# Patient Record
Sex: MALE | Race: WHITE
[De-identification: names, ages, dates, MRNs, and addresses within clinical notes are randomized per-mention and may not be internally consistent; named-entity substitution may affect disease eponyms.]

---

## 2017-07-07 ENCOUNTER — HOSPITAL ENCOUNTER (EMERGENCY)
Dept: HOSPITAL 47 - EC | Age: 37
Discharge: HOME | End: 2017-07-07
Payer: COMMERCIAL

## 2017-07-07 VITALS
DIASTOLIC BLOOD PRESSURE: 70 MMHG | HEART RATE: 53 BPM | SYSTOLIC BLOOD PRESSURE: 104 MMHG | TEMPERATURE: 97.9 F | RESPIRATION RATE: 20 BRPM
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
## 2017-07-07 DIAGNOSIS — S92.322A: Primary | ICD-10-CM

## 2017-07-07 PROCEDURE — 29515 APPLICATION SHORT LEG SPLINT: CPT

## 2017-07-07 PROCEDURE — 99283 EMERGENCY DEPT VISIT LOW MDM: CPT

## 2017-07-07 NOTE — XR
EXAMINATION TYPE: XR foot complete LT , 3 VIEWS

 

DATE OF EXAM ORDERED: 7/7/2017

 

HISTORY: Pain.

 

COMPARISON: None.

 

FINDINGS:  There is a mild hallux valgus deformity. There is fibular deviation of the left second dig
it. There is a partially healed fracture of the proximal diaphysis of the left second digit. This is 
mildly comminuted and minimally displaced. No additional fractures are seen.

 

IMPRESSION: MINIMALLY DISPLACED, COMMINUTED FRACTURE THE PROXIMAL DIAPHYSIS OF THE LEFT SECOND DIGIT.


 

CODE A: INITIAL ASSESSMENT FOR CLOSED FRACTURE

## 2017-07-07 NOTE — ED
General Adult HPI





- General


Chief complaint: Extremity Injury, Lower


Stated complaint: crushing injury left foot


Time Seen by Provider: 07/07/17 08:59


Source: patient, RN notes reviewed


Mode of arrival: ambulatory


Limitations: no limitations





- History of Present Illness


Initial comments: 





Patient 36-year-old male who presents emergency room today with a chief 

complaint of an injury to the left foot that occurred approximately 10 days 

ago.  He does admit that he dropped a proximally 50 pound pieces to across his 

left foot.  He does admit that he had work boots on at the time.  He states 

that it was black and blue with some bruising and swelling.  States swelling 

and bruising has improved but he still having pain across top of foot.  States 

causing to walk with a limp.  He denies any other complaints or associated 

symptoms. Patient denies any recent fever, chills, shortness of breath, chest 

pain, back pain, abdominal pain, nausea or vomiting, numbness or tingling, 

dysuria or hematuria, constipation or diarrhea, headaches or visual changes, or 

any other complaints.





- Related Data


 Home Medications











 Medication  Instructions  Recorded  Confirmed


 


Acetaminophen Tab [Tylenol Tab] 650 mg PO Q4H PRN 07/07/17 07/07/17








 Previous Rx's











 Medication  Instructions  Recorded


 


Hydrocodone/Acetaminophen [Norco 1 each PO Q6HR PRN #15 tab 07/07/17





5-325]  











 Allergies











Allergy/AdvReac Type Severity Reaction Status Date / Time


 


No Known Allergies Allergy   Verified 07/07/17 08:55














Review of Systems


ROS Statement: 


Those systems with pertinent positive or pertinent negative responses have been 

documented in the HPI.





ROS Other: All systems not noted in ROS Statement are negative.





Past Medical History


Past Medical History: No Reported History


History of Any Multi-Drug Resistant Organisms: None Reported


Past Surgical History: No Surgical Hx Reported


Past Psychological History: No Psychological Hx Reported


Smoking Status: Never smoker


Past Alcohol Use History: None Reported


Past Drug Use History: None Reported





General Exam





- General Exam Comments


Initial Comments: 





General:  The patient is awake and alert, in no distress, and does not appear 

acutely ill.   


Neck:  The neck is supple, there is no tenderness or JVD.  


Cardiovascular:  There is a regular rate and rhythm. No murmur, rub or gallop 

is appreciated.


Respiratory:  Lungs are clear to auscultation, respirations are non-labored, 

breath sounds are equal.  No wheezes, stridor, rales, or rhonchi.


Musculoskeletal: Normal appearance of the left foot no obvious deformity.  No 

swelling bruising at this time.  Patient does have tenderness over the distal 

second through fourth metatarsals.  No other bony tenderness on exam.  Shows 

good range of motion.  Strength 5/5.  Pulses equal bilaterally 2+.  Sensation 

intact.


Neurological:  A&O x 3. CN II-XII intact, There are no obvious motor or sensory 

deficits. Coordination appears grossly intact. Speech is normal.


Skin:  Skin is warm and dry and no rashes or lesions are noted. 


Psychiatric:  Normal mood and affect.  


Limitations: no limitations





Course


 Vital Signs











  07/07/17





  08:49


 


Temperature 97.9 F


 


Pulse Rate 53 L


 


Respiratory 20





Rate 


 


Blood Pressure 104/70


 


O2 Sat by Pulse 98





Oximetry 














Medical Decision Making





- Medical Decision Making





X-rays reviewed and does show a proximal second metatarsal fracture with 

minimal to no displacement.  Results were discussed with the patient.  Patient 

did have a tether on this left ankle and he did cause by mouth officer and they 

did advise him that he may cut it off and that they would replace it if he goes 

over there.  We did remove his tether here in the emergency room so we can 

place a short leg posterior OCL splint.  Neurovascular rechecked and intact.  

Patient will be given a prescription for crutches and a few pain pills of Norco 

and advised follow-up with orthopedics.





Disposition


Clinical Impression: 


 Foot fracture, left





Disposition: HOME SELF-CARE


Condition: Good


Instructions:  Foot Fracture in Adults (ED)


Additional Instructions: 


Please leave splint in place until follow-up with orthopedics.  Please use 

crutches with nonweightbearing.  Please continue to ice elevate the affected 

area.  Please return for any other concerns.


Prescriptions: 


Hydrocodone/Acetaminophen [Norco 5-325] 1 each PO Q6HR PRN #15 tab


 PRN Reason: Pain


Referrals: 


None,Stated [Primary Care Provider] - 1-2 days


Alex Bernardo MD [STAFF PHYSICIAN] - 1-2 days


Time of Disposition: 10:01

## 2017-08-07 ENCOUNTER — HOSPITAL ENCOUNTER (EMERGENCY)
Dept: HOSPITAL 47 - EC | Age: 37
Discharge: HOME | End: 2017-08-07
Payer: COMMERCIAL

## 2017-08-07 VITALS
TEMPERATURE: 97.9 F | SYSTOLIC BLOOD PRESSURE: 128 MMHG | HEART RATE: 70 BPM | RESPIRATION RATE: 18 BRPM | DIASTOLIC BLOOD PRESSURE: 80 MMHG

## 2017-08-07 DIAGNOSIS — S02.5XXA: Primary | ICD-10-CM
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
## 2017-08-07 PROCEDURE — 99283 EMERGENCY DEPT VISIT LOW MDM: CPT

## 2017-08-07 NOTE — ED
General Adult HPI





- General


Chief complaint: Allergic Reaction


Stated complaint: Dental


Time Seen by Provider: 08/07/17 14:50


Source: patient, RN notes reviewed


Mode of arrival: ambulatory


Limitations: no limitations





- History of Present Illness


Initial comments: 





36 yo male presents to the ER with chief complaint of right sided tooth 

fracture.  Patient states that his tooth #6 is where he points.  Patient has 

had this pain for about 2 weeks now he ate something today and further broke.  

Patient has been having pain today so he called the dentist and they referred 

him here.  Patient states it hurts up into the gumline.  There is been no fever 

chills no pain into the neck or difficulty opening or closing the mouth. 

Patient denies any recent fever, chills, shortness of breath, chest pain, back 

pain, abdominal pain, nausea vomiting, numbness or tingling, dysuria or 

hematuria, constipation or diarrhea, headaches or visual changes, or any other 

current symptoms.





- Related Data


 Home Medications











 Medication  Instructions  Recorded  Confirmed


 


Acetaminophen Tab [Tylenol Tab] 650 mg PO Q4H PRN 07/07/17 07/07/17








 Previous Rx's











 Medication  Instructions  Recorded


 


Hydrocodone/Acetaminophen [Norco 1 each PO Q6HR PRN #15 tab 07/07/17





5-325]  


 


Hydrocodone/Acetaminophen [Norco 1 each PO Q6HR PRN #20 tab 08/07/17





5-325]  


 


Penicillin V Potassium [Pen Vee K] 500 mg PO TID #40 tab 08/07/17











 Allergies











Allergy/AdvReac Type Severity Reaction Status Date / Time


 


No Known Allergies Allergy   Verified 07/07/17 08:55














Review of Systems


ROS Statement: 


Those systems with pertinent positive or pertinent negative responses have been 

documented in the HPI.





ROS Other: All systems not noted in ROS Statement are negative.





Past Medical History


Past Medical History: No Reported History


History of Any Multi-Drug Resistant Organisms: None Reported


Past Surgical History: No Surgical Hx Reported


Past Psychological History: No Psychological Hx Reported


Smoking Status: Never smoker


Past Alcohol Use History: None Reported


Past Drug Use History: None Reported





General Exam


Limitations: no limitations


General appearance: alert, in no apparent distress


ENT exam: Present: normal exam, mucous membranes moist


  ** Expanded


Ear exam: Present: normal external inspection


Mouth exam: Present: normal external inspection


Teeth exam: Present: dental caries (Diffuse), fractured tooth # (6), dental 

tenderness # (6), other (No abscess noted).  Absent: gingival enlargement


Neck exam: Present: normal inspection.  Absent: tenderness, meningismus, 

lymphadenopathy


Respiratory exam: Present: normal lung sounds bilaterally.  Absent: respiratory 

distress, wheezes, rales, rhonchi, stridor


Cardiovascular Exam: Present: regular rate, normal rhythm, normal heart sounds.

  Absent: systolic murmur, diastolic murmur, rubs, gallop, clicks


Neurological exam: Present: alert, oriented X3


Psychiatric exam: Present: normal affect, normal mood


Skin exam: Present: warm, dry, intact, normal color.  Absent: rash





Course





 Vital Signs











  08/07/17





  14:53


 


Temperature 97.9 F


 


Pulse Rate 70


 


Respiratory 18





Rate 


 


Blood Pressure 128/80


 


O2 Sat by Pulse 99





Oximetry 














Medical Decision Making





- Medical Decision Making





37-year-old male presents emergency department chief complaint of right-sided 

dental pain.  This time patient underwent pain medication here.  We discussed 

follow-up with his dentist open her antibiotics pain meds for home.  We did 

discuss her temporalis all his questions.  He stated that he understood and is 

in agreement with plan.  All cushions have been answered.  He will be 

discharged.





Disposition


Clinical Impression: 


 Tooth fracture





Disposition: HOME SELF-CARE


Condition: Stable


Instructions:  Toothache (ED)


Additional Instructions: 


Please use medication as discussed. Please follow up with family doctor if 

symptoms have not improved over the next two days. Please return to the 

emergency room if your symptoms increase or worsen or for any other concerns.


[facility] [address] [address]





[phone] (existing clients only)





For new clients: [phone]





1st consult: $50 (includes Xrays)





Usually 30% less then private dentist for visits after. 





[facility]





Have to pay $50 for Xrays anmd rest is covered.





[phone] 








Prescriptions: 


Hydrocodone/Acetaminophen [Norco 5-325] 1 each PO Q6HR PRN #20 tab


 PRN Reason: Pain


Penicillin V Potassium [Pen Vee K] 500 mg PO TID #40 tab


Referrals: 


Andrews Benavides DO [STAFF PHYSICIAN] - 1-2 days


Time of Disposition: 15:01

## 2022-03-18 ENCOUNTER — HOSPITAL ENCOUNTER (EMERGENCY)
Dept: HOSPITAL 47 - EC | Age: 42
Discharge: HOME | End: 2022-03-18
Payer: COMMERCIAL

## 2022-03-18 VITALS — TEMPERATURE: 98.4 F | DIASTOLIC BLOOD PRESSURE: 72 MMHG | HEART RATE: 77 BPM | SYSTOLIC BLOOD PRESSURE: 115 MMHG

## 2022-03-18 VITALS — RESPIRATION RATE: 18 BRPM

## 2022-03-18 DIAGNOSIS — D72.829: ICD-10-CM

## 2022-03-18 DIAGNOSIS — N20.0: Primary | ICD-10-CM

## 2022-03-18 LAB
ALBUMIN SERPL-MCNC: 4.6 G/DL (ref 3.5–5)
ALP SERPL-CCNC: 69 U/L (ref 38–126)
ALT SERPL-CCNC: 38 U/L (ref 4–49)
AMYLASE SERPL-CCNC: 89 U/L (ref 30–110)
ANION GAP SERPL CALC-SCNC: 10 MMOL/L
AST SERPL-CCNC: 30 U/L (ref 17–59)
BASOPHILS # BLD AUTO: 0 K/UL (ref 0–0.2)
BASOPHILS NFR BLD AUTO: 0 %
BUN SERPL-SCNC: 25 MG/DL (ref 9–20)
CALCIUM SPEC-MCNC: 9.7 MG/DL (ref 8.4–10.2)
CHLORIDE SERPL-SCNC: 104 MMOL/L (ref 98–107)
CO2 SERPL-SCNC: 24 MMOL/L (ref 22–30)
EOSINOPHIL # BLD AUTO: 0.1 K/UL (ref 0–0.7)
EOSINOPHIL NFR BLD AUTO: 1 %
ERYTHROCYTE [DISTWIDTH] IN BLOOD BY AUTOMATED COUNT: 4.89 M/UL (ref 4.3–5.9)
ERYTHROCYTE [DISTWIDTH] IN BLOOD: 12.7 % (ref 11.5–15.5)
GLUCOSE SERPL-MCNC: 108 MG/DL (ref 74–99)
HCT VFR BLD AUTO: 46.3 % (ref 39–53)
HGB BLD-MCNC: 15.6 GM/DL (ref 13–17.5)
LIPASE SERPL-CCNC: 77 U/L (ref 23–300)
LYMPHOCYTES # SPEC AUTO: 1.2 K/UL (ref 1–4.8)
LYMPHOCYTES NFR SPEC AUTO: 10 %
MCH RBC QN AUTO: 31.8 PG (ref 25–35)
MCHC RBC AUTO-ENTMCNC: 33.6 G/DL (ref 31–37)
MCV RBC AUTO: 94.6 FL (ref 80–100)
MONOCYTES # BLD AUTO: 0.5 K/UL (ref 0–1)
MONOCYTES NFR BLD AUTO: 4 %
NEUTROPHILS # BLD AUTO: 10.2 K/UL (ref 1.3–7.7)
NEUTROPHILS NFR BLD AUTO: 84 %
PH UR: 6 [PH] (ref 5–8)
PLATELET # BLD AUTO: 284 K/UL (ref 150–450)
POTASSIUM SERPL-SCNC: 4.1 MMOL/L (ref 3.5–5.1)
PROT SERPL-MCNC: 7.4 G/DL (ref 6.3–8.2)
SODIUM SERPL-SCNC: 138 MMOL/L (ref 137–145)
SP GR UR: 1 (ref 1–1.03)
UROBILINOGEN UR QL STRIP: <2 MG/DL (ref ?–2)
WBC # BLD AUTO: 12.1 K/UL (ref 3.8–10.6)

## 2022-03-18 PROCEDURE — 81003 URINALYSIS AUTO W/O SCOPE: CPT

## 2022-03-18 PROCEDURE — 96374 THER/PROPH/DIAG INJ IV PUSH: CPT

## 2022-03-18 PROCEDURE — 96375 TX/PRO/DX INJ NEW DRUG ADDON: CPT

## 2022-03-18 PROCEDURE — 96361 HYDRATE IV INFUSION ADD-ON: CPT

## 2022-03-18 PROCEDURE — 83605 ASSAY OF LACTIC ACID: CPT

## 2022-03-18 PROCEDURE — 83690 ASSAY OF LIPASE: CPT

## 2022-03-18 PROCEDURE — 82150 ASSAY OF AMYLASE: CPT

## 2022-03-18 PROCEDURE — 85025 COMPLETE CBC W/AUTO DIFF WBC: CPT

## 2022-03-18 PROCEDURE — 36415 COLL VENOUS BLD VENIPUNCTURE: CPT

## 2022-03-18 PROCEDURE — 80053 COMPREHEN METABOLIC PANEL: CPT

## 2022-03-18 PROCEDURE — 99284 EMERGENCY DEPT VISIT MOD MDM: CPT

## 2022-03-18 PROCEDURE — 74176 CT ABD & PELVIS W/O CONTRAST: CPT

## 2022-03-18 NOTE — CT
EXAMINATION TYPE: CT abdomen pelvis wo con

 

DATE OF EXAM: 3/18/2022

 

COMPARISON: None

 

HISTORY: 41-year-old male Right flank pain, history of stones

 

CT DLP: 700.3 mGycm. Automated exposure control for dose reduction was used.

 

TECHNIQUE: Contiguous axial scanning of the abdomen and pelvis without IV contrast. Coronal and sagit
juan reconstructions performed. 

 

FINDINGS: 

Heart normal size without pericardial effusion. Lung bases clear without pleural effusion.

 

Liver enlarged at 20.7 cm. No significant hepatic steatosis apparent on this noncontrast exam.

 

Otherwise, gallbladder, adrenal glands, and pancreas show no gross abnormality.

 

Numerous punctate calcification in the lumen is within the spleen. Small anterior splenule.

 

There is a nonobstructive 5 mm left renal calculus. Extrarenal pelvis on the left.

 

There is mild fullness of the right renal collecting system. No suspicious ossification is seen eithe
r within the kidney or along the course of the right ureter.

 

No dilated small bowel, free fluid, or free air. A few scattered prominent but nonenlarged mesenteric
 lymph nodes measure up to 6 mm in the mid abdomen and right lower quadrant. No mesenteric or retrope
ritoneal lymphadenopathy otherwise seen.

 

Tiny normal caliber appendix. There is moderate stool burden. No pericolonic inflammatory change. Pro
minent stool distending the rectum up to 5.6 cm wide.

 

Bladder urine distended. Prostate gland measures 3.4 cm wide. No abnormal fluid collection in the pel
vis or pelvic lymphadenopathy.

 

Bones: Scattered bone islands about the hips. No osseous destructive process.

 

 

 

IMPRESSION: 

1.  Mild fullness of the right renal collecting system but without any right-sided renal or ureteral 
calculus seen. Query possibility of a recently passed stone.

2.  On the left, there is a nonobstructive 5 mm stone.

3.  Incidental: Hepatomegaly (20.7 cm). Moderate stool burden.

## 2022-03-18 NOTE — ED
Abdominal Pain HPI





- General


Chief Complaint: Abdominal Pain


Stated Complaint: Poss Kidney Stone


Time Seen by Provider: 22 12:06


Source: patient, RN notes reviewed


Mode of arrival: ambulatory


Limitations: no limitations





- History of Present Illness


Initial Comments: 


This is a 41-year-old male who presents to the emergency department with right 

lower quadrant and right flank pain.  Patient states that this started 2 days 

ago, and is described as sharp and throbbing.  He does have a history of kidney 

stones, however this pain feels different.  He is very tender on the right side 

with radiation around the groin.  States that he has never had palpable 

tenderness before. When he has kidney stones, he is often able to pass them on 

his own by drinking a lot of water. States that he feels like he may have a 

kidney stone that is "stuck". He has mild nasuea but not vomiting. Denies any 

fevers/chills, hematuria, or dysuria. 





MD Complaint: abdominal pain, flank pain


Onset/Timin


-: days(s)


Location: RLQ, R flank





- Related Data


                                Home Medications











 Medication  Instructions  Recorded  Confirmed


 


No Known Home Medications  22











                                    Allergies











Allergy/AdvReac Type Severity Reaction Status Date / Time


 


No Known Allergies Allergy   Verified 22 14:24














Review of Systems


ROS Statement: 


Those systems with pertinent positive or pertinent negative responses have been 

documented in the HPI.





ROS Other: All systems not noted in ROS Statement are negative.


Constitutional: Denies: fever, chills


ENT: Denies: ear pain, throat pain


Respiratory: Denies: cough, dyspnea


Cardiovascular: Denies: chest pain, palpitations


Gastrointestinal: Reports: abdominal pain.  Denies: nausea, vomiting, diarrhea


Genitourinary: Denies: urgency, dysuria, frequency, hematuria


Musculoskeletal: Reports: back pain


Skin: Denies: rash





Past Medical History


Past Medical History: No Reported History


History of Any Multi-Drug Resistant Organisms: None Reported


Past Surgical History: No Surgical Hx Reported


Past Psychological History: No Psychological Hx Reported


Smoking Status: Never smoker


Past Alcohol Use History: None Reported


Past Drug Use History: None Reported





General Exam


Limitations: no limitations


General appearance: alert, in distress


Respiratory exam: Present: normal lung sounds bilaterally.  Absent: respiratory 

distress, wheezes, rales, rhonchi, stridor


Cardiovascular Exam: Present: regular rate, normal rhythm, normal heart sounds. 

Absent: systolic murmur, diastolic murmur, rubs, gallop, clicks


GI/Abdominal exam: Present: soft, tenderness (RLQ), normal bowel sounds.  

Absent: distended, guarding, rebound, organomegaly, mass


Back exam: Present: CVA tenderness (R)


Neurological exam: Present: alert, oriented X3, CN II-XII intact


Psychiatric exam: Present: normal affect, normal mood


Skin exam: Present: warm, dry, intact, normal color.  Absent: rash





Course


                                   Vital Signs











  22





  11:25 14:08 16:17


 


Temperature 98.2 F 98.3 F 98.4 F


 


Pulse Rate 98 74 77


 


Respiratory 18 18 18





Rate   


 


Blood Pressure 130/88 102/60 115/72


 


O2 Sat by Pulse 98 98 97





Oximetry   














Medical Decision Making





- Medical Decision Making


This is a 41 year old male who presents to the emergency department for right 

flank and right abdominal pain. Patient hydrated with 1L of fluids, pain 

controlled with Dilauded, and Zofran given as well for nausea. Lab work revealed

an elevated white count but was otherwise unremarkable.  CT of the abdomen and 

pelvis revealed mild fullness of the right renal collecting system, suggesting 

he may have recently passed a kidney stone.  Upon reevaluation of the patient, 

patient states that he is feeling much better, he felt the pain travel down the 

groin, and believes he may have actually passed a kidney stone. Given patient's 

improvement in symptoms, patient is stable for discharge home. 





Return precautions reviewed in depth, the patient is instructed to return to the

emergency department if his symptoms worsen, including but not limited to 

increasing pain, fevers, chills, nausea, and vomiting. Patient verbalized 

understanding. 





This case was discussed in detail with the attending ED physician. Presentation,

findings, and treatment plan discussed in detail as well. 














- Lab Data


Result diagrams: 


                                 22 12:55





                                 22 12:55


                                   Lab Results











  22 Range/Units





  12:08 12:55 12:55 


 


WBC   12.1 H   (3.8-10.6)  k/uL


 


RBC   4.89   (4.30-5.90)  m/uL


 


Hgb   15.6   (13.0-17.5)  gm/dL


 


Hct   46.3   (39.0-53.0)  %


 


MCV   94.6   (80.0-100.0)  fL


 


MCH   31.8   (25.0-35.0)  pg


 


MCHC   33.6   (31.0-37.0)  g/dL


 


RDW   12.7   (11.5-15.5)  %


 


Plt Count   284   (150-450)  k/uL


 


MPV   7.3   


 


Neutrophils %   84   %


 


Lymphocytes %   10   %


 


Monocytes %   4   %


 


Eosinophils %   1   %


 


Basophils %   0   %


 


Neutrophils #   10.2 H   (1.3-7.7)  k/uL


 


Lymphocytes #   1.2   (1.0-4.8)  k/uL


 


Monocytes #   0.5   (0-1.0)  k/uL


 


Eosinophils #   0.1   (0-0.7)  k/uL


 


Basophils #   0.0   (0-0.2)  k/uL


 


Sodium    138  (137-145)  mmol/L


 


Potassium    4.1  (3.5-5.1)  mmol/L


 


Chloride    104  ()  mmol/L


 


Carbon Dioxide    24  (22-30)  mmol/L


 


Anion Gap    10  mmol/L


 


BUN    25 H  (9-20)  mg/dL


 


Creatinine    1.18  (0.66-1.25)  mg/dL


 


Est GFR (CKD-EPI)AfAm    89  (>60 ml/min/1.73 sqM)  


 


Est GFR (CKD-EPI)NonAf    77  (>60 ml/min/1.73 sqM)  


 


Glucose    108 H  (74-99)  mg/dL


 


Plasma Lactic Acid Festus     (0.7-2.0)  mmol/L


 


Calcium    9.7  (8.4-10.2)  mg/dL


 


Total Bilirubin    0.5  (0.2-1.3)  mg/dL


 


AST    30  (17-59)  U/L


 


ALT    38  (4-49)  U/L


 


Alkaline Phosphatase    69  ()  U/L


 


Total Protein    7.4  (6.3-8.2)  g/dL


 


Albumin    4.6  (3.5-5.0)  g/dL


 


Amylase    89  ()  U/L


 


Lipase    77  ()  U/L


 


Urine Color  Colorless    


 


Urine Appearance  Clear    (Clear)  


 


Urine pH  6.0    (5.0-8.0)  


 


Ur Specific Gravity  1.002    (1.001-1.035)  


 


Urine Protein  Negative    (Negative)  


 


Urine Glucose (UA)  Negative    (Negative)  


 


Urine Ketones  Negative    (Negative)  


 


Urine Blood  Negative    (Negative)  


 


Urine Nitrite  Negative    (Negative)  


 


Urine Bilirubin  Negative    (Negative)  


 


Urine Urobilinogen  <2.0    (<2.0)  mg/dL


 


Ur Leukocyte Esterase  Negative    (Negative)  














  22 Range/Units





  12:55 


 


WBC   (3.8-10.6)  k/uL


 


RBC   (4.30-5.90)  m/uL


 


Hgb   (13.0-17.5)  gm/dL


 


Hct   (39.0-53.0)  %


 


MCV   (80.0-100.0)  fL


 


MCH   (25.0-35.0)  pg


 


MCHC   (31.0-37.0)  g/dL


 


RDW   (11.5-15.5)  %


 


Plt Count   (150-450)  k/uL


 


MPV   


 


Neutrophils %   %


 


Lymphocytes %   %


 


Monocytes %   %


 


Eosinophils %   %


 


Basophils %   %


 


Neutrophils #   (1.3-7.7)  k/uL


 


Lymphocytes #   (1.0-4.8)  k/uL


 


Monocytes #   (0-1.0)  k/uL


 


Eosinophils #   (0-0.7)  k/uL


 


Basophils #   (0-0.2)  k/uL


 


Sodium   (137-145)  mmol/L


 


Potassium   (3.5-5.1)  mmol/L


 


Chloride   ()  mmol/L


 


Carbon Dioxide   (22-30)  mmol/L


 


Anion Gap   mmol/L


 


BUN   (9-20)  mg/dL


 


Creatinine   (0.66-1.25)  mg/dL


 


Est GFR (CKD-EPI)AfAm   (>60 ml/min/1.73 sqM)  


 


Est GFR (CKD-EPI)NonAf   (>60 ml/min/1.73 sqM)  


 


Glucose   (74-99)  mg/dL


 


Plasma Lactic Acid Festus  1.2  (0.7-2.0)  mmol/L


 


Calcium   (8.4-10.2)  mg/dL


 


Total Bilirubin   (0.2-1.3)  mg/dL


 


AST   (17-59)  U/L


 


ALT   (4-49)  U/L


 


Alkaline Phosphatase   ()  U/L


 


Total Protein   (6.3-8.2)  g/dL


 


Albumin   (3.5-5.0)  g/dL


 


Amylase   ()  U/L


 


Lipase   ()  U/L


 


Urine Color   


 


Urine Appearance   (Clear)  


 


Urine pH   (5.0-8.0)  


 


Ur Specific Gravity   (1.001-1.035)  


 


Urine Protein   (Negative)  


 


Urine Glucose (UA)   (Negative)  


 


Urine Ketones   (Negative)  


 


Urine Blood   (Negative)  


 


Urine Nitrite   (Negative)  


 


Urine Bilirubin   (Negative)  


 


Urine Urobilinogen   (<2.0)  mg/dL


 


Ur Leukocyte Esterase   (Negative)  














- Radiology Data


Radiology results: report reviewed, image reviewed





Disposition


Clinical Impression: 


 Renal calculus, right





Disposition: HOME SELF-CARE


Instructions (If sedation given, give patient instructions):  Kidney Stones 

(ED), Renal Colic (ED), Flank Pain (ED)


Additional Instructions: 


Return to the emergency department if you develop symptoms including but not 

limited to increasing pain or fevers/chills. Follow up with your primary care 

provider in 1-2 days.


Is patient prescribed a controlled substance at d/c from ED?: No


Referrals: 


None,Stated [Primary Care Provider] - 1-2 days

## 2023-08-30 ENCOUNTER — HOSPITAL ENCOUNTER (OUTPATIENT)
Dept: HOSPITAL 47 - EC | Age: 43
Setting detail: OBSERVATION
Discharge: HOME | End: 2023-08-30
Attending: HOSPITALIST | Admitting: HOSPITALIST
Payer: COMMERCIAL

## 2023-08-30 VITALS
HEART RATE: 89 BPM | SYSTOLIC BLOOD PRESSURE: 105 MMHG | DIASTOLIC BLOOD PRESSURE: 67 MMHG | RESPIRATION RATE: 16 BRPM | TEMPERATURE: 98.1 F

## 2023-08-30 DIAGNOSIS — L50.9: ICD-10-CM

## 2023-08-30 DIAGNOSIS — Z87.442: ICD-10-CM

## 2023-08-30 DIAGNOSIS — R07.89: Primary | ICD-10-CM

## 2023-08-30 DIAGNOSIS — Z91.041: ICD-10-CM

## 2023-08-30 LAB
ALBUMIN SERPL-MCNC: 4.2 G/DL (ref 3.5–5)
ALP SERPL-CCNC: 75 U/L (ref 38–126)
ALT SERPL-CCNC: 27 U/L (ref 4–49)
AMYLASE SERPL-CCNC: 80 U/L (ref 30–110)
ANION GAP SERPL CALC-SCNC: 10 MMOL/L
APTT BLD: 21.4 SEC (ref 22–30)
AST SERPL-CCNC: 27 U/L (ref 17–59)
BASOPHILS # BLD AUTO: 0 K/UL (ref 0–0.2)
BASOPHILS NFR BLD AUTO: 0 %
BUN SERPL-SCNC: 17 MG/DL (ref 9–20)
CALCIUM SPEC-MCNC: 9.2 MG/DL (ref 8.4–10.2)
CHLORIDE SERPL-SCNC: 106 MMOL/L (ref 98–107)
CO2 SERPL-SCNC: 22 MMOL/L (ref 22–30)
EOSINOPHIL # BLD AUTO: 0.2 K/UL (ref 0–0.7)
EOSINOPHIL NFR BLD AUTO: 1 %
ERYTHROCYTE [DISTWIDTH] IN BLOOD BY AUTOMATED COUNT: 4.7 M/UL (ref 4.3–5.9)
ERYTHROCYTE [DISTWIDTH] IN BLOOD: 13.3 % (ref 11.5–15.5)
GLUCOSE SERPL-MCNC: 138 MG/DL (ref 74–99)
HCT VFR BLD AUTO: 41.9 % (ref 39–53)
HGB BLD-MCNC: 15 GM/DL (ref 13–17.5)
INR PPP: 1 (ref ?–1.2)
LIPASE SERPL-CCNC: 105 U/L (ref 23–300)
LYMPHOCYTES # SPEC AUTO: 2.2 K/UL (ref 1–4.8)
LYMPHOCYTES NFR SPEC AUTO: 18 %
MAGNESIUM SPEC-SCNC: 2 MG/DL (ref 1.6–2.3)
MCH RBC QN AUTO: 31.8 PG (ref 25–35)
MCHC RBC AUTO-ENTMCNC: 35.7 G/DL (ref 31–37)
MCV RBC AUTO: 89.1 FL (ref 80–100)
MONOCYTES # BLD AUTO: 0.8 K/UL (ref 0–1)
MONOCYTES NFR BLD AUTO: 7 %
NEUTROPHILS # BLD AUTO: 9.1 K/UL (ref 1.3–7.7)
NEUTROPHILS NFR BLD AUTO: 72 %
PLATELET # BLD AUTO: 264 K/UL (ref 150–450)
POTASSIUM SERPL-SCNC: 3.6 MMOL/L (ref 3.5–5.1)
PROT SERPL-MCNC: 7.2 G/DL (ref 6.3–8.2)
PT BLD: 10.9 SEC (ref 9–12)
SODIUM SERPL-SCNC: 138 MMOL/L (ref 137–145)
WBC # BLD AUTO: 12.6 K/UL (ref 3.8–10.6)

## 2023-08-30 PROCEDURE — 71275 CT ANGIOGRAPHY CHEST: CPT

## 2023-08-30 PROCEDURE — 85025 COMPLETE CBC W/AUTO DIFF WBC: CPT

## 2023-08-30 PROCEDURE — 85730 THROMBOPLASTIN TIME PARTIAL: CPT

## 2023-08-30 PROCEDURE — 96361 HYDRATE IV INFUSION ADD-ON: CPT

## 2023-08-30 PROCEDURE — 82150 ASSAY OF AMYLASE: CPT

## 2023-08-30 PROCEDURE — 93306 TTE W/DOPPLER COMPLETE: CPT

## 2023-08-30 PROCEDURE — 96375 TX/PRO/DX INJ NEW DRUG ADDON: CPT

## 2023-08-30 PROCEDURE — 80053 COMPREHEN METABOLIC PANEL: CPT

## 2023-08-30 PROCEDURE — 83735 ASSAY OF MAGNESIUM: CPT

## 2023-08-30 PROCEDURE — 99285 EMERGENCY DEPT VISIT HI MDM: CPT

## 2023-08-30 PROCEDURE — 96374 THER/PROPH/DIAG INJ IV PUSH: CPT

## 2023-08-30 PROCEDURE — 93005 ELECTROCARDIOGRAM TRACING: CPT

## 2023-08-30 PROCEDURE — 36415 COLL VENOUS BLD VENIPUNCTURE: CPT

## 2023-08-30 PROCEDURE — 74174 CTA ABD&PLVS W/CONTRAST: CPT

## 2023-08-30 PROCEDURE — 84484 ASSAY OF TROPONIN QUANT: CPT

## 2023-08-30 PROCEDURE — 93017 CV STRESS TEST TRACING ONLY: CPT

## 2023-08-30 PROCEDURE — 83690 ASSAY OF LIPASE: CPT

## 2023-08-30 PROCEDURE — 85610 PROTHROMBIN TIME: CPT

## 2023-08-30 PROCEDURE — 80306 DRUG TEST PRSMV INSTRMNT: CPT

## 2023-08-30 NOTE — CT
EXAMINATION TYPE: CT angio thor/abd pel aorta

 

DATE OF EXAM: 8/30/2023

 

COMPARISON: Prior CT abdomen and pelvis March 18, 2022

 

HISTORY: CHEST PAIN RADIATING TO BACK [id number],POSSIBLE AORTIC INJURY

 

CT DLP: 2235.8 mGycm. Automated Exposure Control for Dose Reduction was Utilized.

 

CONTRAST: 

CTA scan of the thorax, abdomen and pelvis is performed without and with IV Contrast, patient injecte
d with 100 mL of Isovue 370. Dissection protocol.

 

FINDINGS:

 

Vascular: Noncontrast images show no suspicious hyperdense material to suggest intramural hematoma. P
ostcontrast images show bovine type aortic arch which is normal variant. No significant stenosis. Foc
al prominence orientation of the proximal descending thoracic aorta to 3.2 cm is seen. Patent celiac 
artery, SMA, bilateral single renal arteries and JANN. No AAA. Patent iliac arteries into the femoral 
arteries. No linear hypodensity to suggest dissection. No suspicious retroperitoneal fluid collection
.

 

LUNGS: Stable 1.7 x 1.5 cm nodular scarring posterior right lower lobe axial image 108.   There is no
 pleural effusion or pneumothorax seen.  The tracheobronchial tree is patent.

 

MEDIASTINUM: There are persistent calcified prominent and slightly enlarged pretracheal, subcarinal, 
AP window, and right hilar lymph nodes. Cardiomegaly with mild to moderate biatrial and mild biventri
cular dilatation is redemonstrated.  No pericardial effusion is seen.  

 

LIVER/GB: Mildly enlarged liver redemonstrated.

 

PANCREAS: No significant abnormality is seen.

 

SPLEEN: Punctate calcifications throughout the spleen consistent with product of old granulomatous di
sease are redemonstrated.

 

ADRENALS: No significant abnormality is seen.

 

KIDNEYS: Calculi in the mid to lower pole left kidney near axial image 77 series 201 are redemonstrat
ed.

 

BOWEL:  No significant abnormality is seen.

 

GENITAL ORGANS: Prostate gland upper limits of normal in size.

 

LYMPH NODES: No greater than 1cm abdominal or pelvic lymph nodes are appreciated.

 

OSSEOUS STRUCTURES: No significant abnormality is seen.

 

OTHER: No significant additional abnormality is seen.

 

IMPRESSION: Focal ectasia or prominence of the proximal descending thoracic aorta to 3.2 cm. No aorti
c dissection. No AAA. No acute findings clearly seen to account for patient's symptoms.

## 2023-08-30 NOTE — ED
General Adult HPI





- General


Chief complaint: Chest Pain


Stated complaint: Chest pain


Time Seen by Provider: 08/30/23 02:30


Source: patient, EMS, RN notes reviewed, old records reviewed


Mode of arrival: ambulatory


Limitations: no limitations





- History of Present Illness


Initial comments: 





Patient is a 43-year-old male with past medical history remarkable for kidney 

stones and prior meth use currently in penitentiary complaining of sudden onset chest 

pain that woke him up prior to arrival.  Describes as a sharp intermittent pain 

that radiates from his left shoulder as well as between the shoulder blades and 

his back.  No history of cardiac disease.  States it comes and goes.  Denies any

nausea or vomiting.  Denies any abdominal pain, diarrhea, fevers, chills, cough,

shortness of breath.  Denies any diaphoresis.  Primary complaint is chest pain 

radiating to the back.  Presents for further evaluation at this time.  Has been 

in prison for the last 4 months.  No recent drug use.  Presents in custody of 

police.  EMS provided the patient with 324 mg of aspirin as well as IV Zofran.No

known palliative or provocative factors.





- Related Data


                                Home Medications











 Medication  Instructions  Recorded  Confirmed


 


No Known Home Medications  03/18/22 03/18/22











                                    Allergies











Allergy/AdvReac Type Severity Reaction Status Date / Time


 


Iodinated Contrast Media Allergy  Rash/Hives Verified 08/30/23 03:16


 


iodine Allergy  Rash/Hives Verified 08/30/23 03:16














Review of Systems


ROS Statement: 


Those systems with pertinent positive or pertinent negative responses have been 

documented in the HPI.


Review of Systems:


CONST: Denies fever 


EYES: Denies blurry vision 


ENT: Denies nasal congestion  


C/V: Endorses chest pain


RESP: Denies shortness of breath 


GI: Denies abdominal pain 


: Denies dysuria  


SKIN: Denies rash.


MSK: Denies joint pain.


NEURO: Denies headache 


ROS Other: All systems not noted in ROS Statement are negative.





Past Medical History


Past Medical History: No Reported History


History of Any Multi-Drug Resistant Organisms: None Reported


Past Surgical History: No Surgical Hx Reported


Past Psychological History: No Psychological Hx Reported


Smoking Status: Never smoker


Past Alcohol Use History: None Reported


Past Drug Use History: None Reported





General Exam





- General Exam Comments


Initial Comments: 





General: Appears in mild acute distress.


HEAD:  Normal with no signs of head trauma.


EYES:  PERRLA, EOMI, conjunctiva normal, no discharge.


ENT:  Hearing grossly intact, normal oropharynx.


RESPIRATORY:  Clear breath sounds bilaterally.  No wheezes, rales, or rhonchi.  


C/V:  Regular rate and rhythm. S1 and S2 auscultated, no edema, peripheral 

pulses 2+ and intact throughout.  Chest pain is not reproducible on palpation.


ABD:  Abd is soft, nontender, nondistended


EXT: Normal range of motion, no obvious deformity


SKIN:  No rashes or lesions observed on exposed skin.


NEURO: Alert and oriented 4.  No focal deficits.


Limitations: no limitations





Course


                                   Vital Signs











  08/30/23 08/30/23 08/30/23





  02:31 04:18 05:11


 


Temperature 98.3 F  


 


Pulse Rate 52 L 58 L 67


 


Respiratory 19 19 17





Rate   


 


Blood Pressure 103/78 120/84 114/79


 


O2 Sat by Pulse 98  97





Oximetry   














Medical Decision Making





- Medical Decision Making





Was pt. sent in by a medical professional or institution (, PA, NP, urgent 

care, hospital, or nursing home...) When possible be specific


@  -Patient sent from prison for evaluation for chest pain.  currently in police

custody.


Did you speak to anyone other than the patient for history (EMS, parent, family,

police, friend...)? What history was obtained from this source 


@  -No


Did you review nursing and triage notes (agree or disagree)?  Why? 


@  -I reviewed and agree with nursing and triage notes


Were old charts reviewed (outside hosp., previous admission, EMS record, old 

EKG, old radiological studies, urgent care reports/EKG's, nursing home records)?

Report findings 


@  -No old charts were reviewed


Differential Diagnosis (chest pain, altered mental status, abdominal pain women,

abdominal pain men, vaginal bleeding, weakness, fever, dyspnea, syncope, 

headache, dizziness, GI bleed, back pain, seizure, CVA, palpatations, mental 

health, musculoskeletal)? 


@  -Differential Chest Pain:


Stable Angina, Unstable Angina, STEMI, NSTEMI Aortic Dissection, Pneumothorax, 

Musculoskeletal, Esophageal Spasm GERD, Cholecystitis, Pancreatitis, Zoster, 

this is not meant to be an all-inclusive list. 





EKG interpreted by me (3pts min.).


@  -As above


X-rays interpreted by me (1pt min.).


@  -None done


CT interpreted by me (1pt min.).


@  -Patient's CT of the aorta reveals no obvious dissection or other obvious 

explaination for the patient's chest pain.


U/S interpreted by me (1pt. min.).


@  -None done


What testing was considered but not performed or refused? (CT, X-rays, U/S, 

labs)? Why?


@  -None


What meds were considered but not given or refused? Why?


@  -None


Did you discuss the management of the patient with other professionals (pro

fessionals i.e. , PA, NP, lab, RT, psych nurse, , , 

teacher, , )? Give summary


@  -Discussed with [facility] of [facility] who accepted the admission.


Was smoking cessation discussed for >3mins.?


@  -No


Was critical care preformed (if so, how long)?


@  -No


Were there social determinants of health that impacted care today? How? 

(Homelessness, low income, unemployed, alcoholism, drug addiction, 

transportation, low edu. Level, literacy, decrease access to med. care, penitentiary, 

rehab)?


@  -No


Was there de-escalation of care discussed even if they declined (Discuss DNR or 

withdrawal of care, Hospice)? DNR status


@  -No


What co-morbidities impacted this encounter? (DM, HTN, Smoking, COPD, CAD, 

Cancer, CVA, ARF, Chemo, Hep., AIDS, mental health diagnosis, sleep apnea, 

morbid obesity)?


@  -None


Was patient admitted / discharged? Hospital course, mention meds given and 

route, prescriptions, significant lab abnormalities, going to OR and other 

pertinent info.


@  -Based on the patient's presentation and physical exam, presents with chest 

pain with radiation to the back.  Consider cardiopulmonary etiology for the 

patient's current symptoms including possible aortic catastrophe.  We will 

obtain CT angiogram of the chest as well as cardio pulmonary workup.  He was in 

agreement with this plan.  He will be given IV morphine.  EMS prior to arrival 

already provide the patient with 324 mg of aspirin.  Vital signs currently are 

within acceptable limits.  He'll be given a 1 L fluid bolus as well.





EKG showed no signs of ischemia.Repeat EKG was also obtained which revealed no 

dynamic changes or acute ischemia.  Patient's CT of the aorta revealed no 

obvious isolation for the patient's symptoms.  No dissection.  Patient's labs 

are remarkable for an undetectable troponin.  Remainder the labs are within 

acceptable limits.





Following CT, patient did experience an ALLERGIC reaction to contrast.  Begin 

breaking out in a pruritic urticaria.  No other symptoms.  Patient was given a 

ALLERGY cocktail consisting of Benadryl, Solu-Medrol, famotidine.  Rash 

improved.  Symptoms improved.





We discussed his workup.  Patient received nitro on the way here from EMS which 

did nothing for his chest pain.  Toradol did not do anything for his chest pain 

it did respond to morphine.  Patient also received a GI cocktail with minimal 

improvement as well.  Currently resting comfortably in no acute distress and 

states this chest pain is improved.  We'll admit the patient for further 

monitoring, trending troponin, echo, and morphine for chest pain as needed.  

Patient also already received aspirin 324 mg.  Cardiology consulted.  He was in 

agreement this plan.





I discussed with the admitting team, prince [facility]ondo[facility] of [facility] accepted the 

admission.


Undiagnosed new problem with uncertain prognosis?


@  -No


Drug Therapy requiring intensive monitoring for toxicity (Heparin, Nitro, 

Insulin, Cardizem)?


@  -No


Were any procedures done?


@  -No





Diagnosis/symptom?


@  -Chest pain


Acute, or Chronic, or Acute on Chronic?


@  -Acute


Uncomplicated (without systemic symptoms) or Complicated (systemic symptoms)?


@  -Complicated


Side effects of treatment?


@  -none


Exacerbation, Progression, or Severe Exacerbation]


@  -no


Poses a threat to life or bodily function?


@  -Possibly yes





Diagnosis/symptom?


@  -Contrast dye ALLERGY, urticaria  rash


Acute, or Chronic, or Acute on Chronic?


@  -Acute


Uncomplicated (without systemic symptoms) or Complicated (systemic symptoms)?


@  -Complicated


Side effects of treatment?


@  -none


Exacerbation, Progression, or Severe Exacerbation]


@  -no


Poses a threat to life or bodily function?


@  -Possibly yes








- Lab Data


Result diagrams: 


                                 08/30/23 02:54





                                 08/30/23 02:54


                                   Lab Results











  08/30/23 08/30/23 08/30/23 Range/Units





  02:54 02:54 02:54 


 


WBC  12.6 H    (3.8-10.6)  k/uL


 


RBC  4.70    (4.30-5.90)  m/uL


 


Hgb  15.0    (13.0-17.5)  gm/dL


 


Hct  41.9    (39.0-53.0)  %


 


MCV  89.1    (80.0-100.0)  fL


 


MCH  31.8    (25.0-35.0)  pg


 


MCHC  35.7    (31.0-37.0)  g/dL


 


RDW  13.3    (11.5-15.5)  %


 


Plt Count  264    (150-450)  k/uL


 


MPV  8.2    


 


Neutrophils %  72    %


 


Lymphocytes %  18    %


 


Monocytes %  7    %


 


Eosinophils %  1    %


 


Basophils %  0    %


 


Neutrophils #  9.1 H    (1.3-7.7)  k/uL


 


Lymphocytes #  2.2    (1.0-4.8)  k/uL


 


Monocytes #  0.8    (0-1.0)  k/uL


 


Eosinophils #  0.2    (0-0.7)  k/uL


 


Basophils #  0.0    (0-0.2)  k/uL


 


PT   10.9   (9.0-12.0)  sec


 


INR   1.0   (<1.2)  


 


APTT   21.4 L   (22.0-30.0)  sec


 


Sodium    138  (137-145)  mmol/L


 


Potassium    3.6  (3.5-5.1)  mmol/L


 


Chloride    106  ()  mmol/L


 


Carbon Dioxide    22  (22-30)  mmol/L


 


Anion Gap    10  mmol/L


 


BUN    17  (9-20)  mg/dL


 


Creatinine    1.19  (0.66-1.25)  mg/dL


 


Est GFR (CKD-EPI)AfAm    87  (>60 ml/min/1.73 sqM)  


 


Est GFR (CKD-EPI)NonAf    76  (>60 ml/min/1.73 sqM)  


 


Glucose    138 H  (74-99)  mg/dL


 


Calcium    9.2  (8.4-10.2)  mg/dL


 


Magnesium    2.0  (1.6-2.3)  mg/dL


 


Total Bilirubin    0.6  (0.2-1.3)  mg/dL


 


AST    27  (17-59)  U/L


 


ALT    27  (4-49)  U/L


 


Alkaline Phosphatase    75  ()  U/L


 


Troponin I     (0.000-0.034)  ng/mL


 


Total Protein    7.2  (6.3-8.2)  g/dL


 


Albumin    4.2  (3.5-5.0)  g/dL


 


Amylase    80  ()  U/L


 


Lipase    105  ()  U/L














  08/30/23 Range/Units





  02:54 


 


WBC   (3.8-10.6)  k/uL


 


RBC   (4.30-5.90)  m/uL


 


Hgb   (13.0-17.5)  gm/dL


 


Hct   (39.0-53.0)  %


 


MCV   (80.0-100.0)  fL


 


MCH   (25.0-35.0)  pg


 


MCHC   (31.0-37.0)  g/dL


 


RDW   (11.5-15.5)  %


 


Plt Count   (150-450)  k/uL


 


MPV   


 


Neutrophils %   %


 


Lymphocytes %   %


 


Monocytes %   %


 


Eosinophils %   %


 


Basophils %   %


 


Neutrophils #   (1.3-7.7)  k/uL


 


Lymphocytes #   (1.0-4.8)  k/uL


 


Monocytes #   (0-1.0)  k/uL


 


Eosinophils #   (0-0.7)  k/uL


 


Basophils #   (0-0.2)  k/uL


 


PT   (9.0-12.0)  sec


 


INR   (<1.2)  


 


APTT   (22.0-30.0)  sec


 


Sodium   (137-145)  mmol/L


 


Potassium   (3.5-5.1)  mmol/L


 


Chloride   ()  mmol/L


 


Carbon Dioxide   (22-30)  mmol/L


 


Anion Gap   mmol/L


 


BUN   (9-20)  mg/dL


 


Creatinine   (0.66-1.25)  mg/dL


 


Est GFR (CKD-EPI)AfAm   (>60 ml/min/1.73 sqM)  


 


Est GFR (CKD-EPI)NonAf   (>60 ml/min/1.73 sqM)  


 


Glucose   (74-99)  mg/dL


 


Calcium   (8.4-10.2)  mg/dL


 


Magnesium   (1.6-2.3)  mg/dL


 


Total Bilirubin   (0.2-1.3)  mg/dL


 


AST   (17-59)  U/L


 


ALT   (4-49)  U/L


 


Alkaline Phosphatase   ()  U/L


 


Troponin I  <0.012  (0.000-0.034)  ng/mL


 


Total Protein   (6.3-8.2)  g/dL


 


Albumin   (3.5-5.0)  g/dL


 


Amylase   ()  U/L


 


Lipase   ()  U/L














- EKG Data


-: EKG Interpreted by Me


EKG Comments: 





12-lead Electrocardiogram Interpretation Note





EKG was reviewed and interpreted by myself. 12-lead ECG performed at 0235 is 

interpreted by me as revealing sinus bradycardia at a rate of 47 beats per 

minute.  Axis is normal.  OK interval is 184 ms, QRS duration is 124 ms, QTc is 

402 ms.. Patient has an incomplete right bundle-branch block it  Appears. There 

were no ST or T wave abnormalities to suggest myocardial ischemia or injury. R 

wave progression across the precordium was satisfactory. By my interpretation 

this EKG is non-diagnostic for acute ischemia.





12-lead Electrocardiogram Interpretation Note





EKG was reviewed and interpreted by myself. 12-lead ECG performed at 0346 is 

interpreted by me as revealing normal sinus rhythm at a rate of 61 beats per 

minute.  Axis is normal.  OK interval is 167 ms.  QRS duration is 116 ms, QTc is

453 ms..  There were no ST or T wave abnormalities to suggest myocardial 

ischemia or injury. R wave progression across the precordium was satisfactory. 

By my interpretation this EKG is non-diagnostic for acute ischemia.  No dynamic 

changes present.





Disposition


Clinical Impression: 


 Chest pain, Allergic reaction to contrast dye, Urticarial rash





Disposition: ADMITTED AS IP TO THIS HOSP


Condition: Stable


Time of Disposition: 04:22

## 2023-08-30 NOTE — P.DS
Providers


Date of admission: 


08/30/23 04:23





Expected date of discharge: 08/30/23


Attending physician: 


Calderon Fair





Primary care physician: 


Stated None





Hospital Course: 





Discharge diagnoses;


Chest pain


History of kidney stones


History of meth use





Hospital course;


patient is a 43-year-old gentleman past medical history significant for kidney 

stones, Meth use who presented to the ER from from shelter accompanied by police 

because of sudden onset of chest pain.  Patient stated that he woke up this 

morning's with chest pain, chest pain was located in his left left side, sharp, 

intermittent radiating towards his back and shoulder blades.  Chest pain was not

associated any shortness of breath.  There was no complete of any sweating.  No 

nausea or vomiting.  Denies any palpitations.  Denies any orthopnea or PND.  

Because of chest pain, patient came to the ER


Initial lab work done in the ER showed WBC 12.6, hemoglobin 15, platelet count 

264, sodium 1:30, potassium 3.6, BUN 17, creatinine 1.19, glucose 138 troponin 

0.012


EKG done in the ER showed heart rate 47, , no ST segment elevation or T-

wave inversion noticeable in any leads


CTA thorax showed focal  ectasia ,  prominence of the proximal descending 

thoracic aorta at 3.2 cm, no aortic dissection, note AAA.





Cardiology was the patient, recommended doing stress test.  Stress test was 

negative for any ischemia.  Echo done showed normal LV function, no wall motion 

abnormalities.  Cardiology cleared the patient for discharge





PHYSICAL EXAMINATION: 





GENERAL: The patient is alert and oriented x3, not in any acute distress. Well 

developed, well nourished. 


HEENT: Pupils are round and equally reacting to light. EOMI. No scleral icterus.

No conjunctival pallor. Normocephalic, atraumatic. No pharyngeal erythema. No 

thyromegaly. 


CARDIOVASCULAR: S1 and S2 present. No murmurs, rubs, or gallops. 


PULMONARY: Chest is clear to auscultation, no wheezing or crackles. 


ABDOMEN: Soft, nontender, nondistended, normoactive bowel sounds. No palpable 

organomegaly. 


MUSCULOSKELETAL: No joint swelling or deformity.


EXTREMITIES: No cyanosis, clubbing, or pedal edema. 


NEUROLOGICAL: Gross neurological examination did not reveal any focal deficits. 


SKIN: No rashes. 











Dictation was produced using dragon dictation software. please excuse any 

grammatical, word or spelling errors. 





Patient Condition at Discharge: Stable





Plan - Discharge Summary


New Discharge Prescriptions: 


Continue


   Mirtazapine 15 mg PO HS


Discharge Medication List





Mirtazapine 15 mg PO HS 08/30/23 [History]








Follow up Appointment(s)/Referral(s): 


None,Stated [Primary Care Provider] - 1-2 days


Discharge Disposition: HOME SELF-CARE

## 2023-08-30 NOTE — P.CRDCN
History of Present Illness


History of present illness: 





meth 4 months ago


no CAD





HISTORY OF PRESENT ILLNESS:  





This is a 43-year-old male with a past medical history significant for 

methamphetamine use. Patient does not follow with a cardiologist. We have been 

asked to see the patient in consultation for chest pain. Patient examined at the

bedside in the emergency room.  Patient is currently incarcerated and has been 

in [facility] for the past 4 months.  He states yesterday while he was sleeping he 

woke up with chest pain in the middle of his chest.  He states the pain went 

into his left arm and into his back.  He states he tried to get comfortable but 

was continuing to move around in his bed due to the discomfort.  This prompted 

his bunk mate to alert one of the correction officers about his pain and he was 

brought to the hospital for further evaluation.  The patient states he did 

receive a nitro which relieved his symptoms.  This morning he reports very mild 

discomfort on the left side of his chest.  He also reports the pain is slightly 

worse with deep inspiration and chest wall palpation.  He denies a previous 

history of hypertension, hyperlipidemia, or diabetes.  He is a nonsmoker.  He 

denies any alcohol use.  He does report drug use of methamphetamines which he 

last used 4 months ago before becoming incarcerated.  He denies a family history

of coronary artery disease.





* EKG reveals sinus mechanism with no signs of acute ischemia


* Laboratory data: WBC 12.6.  Hemoglobin 15.0.  Platelet count 264.  Sodium 138.

   Potassium 3.6.  BUN 17.  Creatinine 1.19.  Magnesium 2.0.  Troponin negative 

  2.


* Current home cardiac medications include none





REVIEW OF SYSTEMS: 


At the time of my exam:


CONSTITUTIONAL: Denies fever or chills.


HEENT: Denies blurred vision, vision changes, or eye pain. Denies hemoptysis 


CARDIOVASCULAR: Denies chest pain. Denies orthopnea. Denies PND. Denies 

palpitations


RESPIRATORY: Denies shortness of breath. 


GASTROINTESTINAL: Denies abdominal pain. Denies nausea or vomiting. 


HEMATOLOGIC: Denies bleeding disorders.


GENITOURINARY:  Denies any blood in urine.


SKIN: Denies pruitis. Denies rash.





PHYSICAL EXAM: 


VITAL SIGNS: Reviewed.


GENERAL: Well-developed in no acute distress. 


HEENT: Head is normocephalic. Pupils are equal, round. Sclerae anicteric. Mucous

membranes of the mouth are moist. Neck supple. No JVD or thyromegaly


LUNGS: Respirations even and unlabored. Lungs essentially clear to auscultation 

bilaterally.


HEART: Regular rate and rhythm.  S1 and S2 heard.


ABDOMEN: Soft. Nondistended. Nontender.


EXTREMITIES: Normal range of motion.  No clubbing or cyanosis.  Peripheral 

pulses intact.  No lower extremity edema


NEUROLOGIC: Awake and alert. Oriented x 3. 





ASSESSMENT: 


Chest pain, troponins negative 2


Methamphetamine use





PLAN: 


An acute coronary and has been ruled out


Obtain 2-D echo to assess cardiac structure and function


Patient will undergo stress testing today


If negative, the patient may be discharged today from a cardiac perspective








Nurse practitioner note has been reviewed by physician. Signing provider agrees 

with the documented findings, assessment, and plan of care. 








Past Medical History


Past Medical History: No Reported History


History of Any Multi-Drug Resistant Organisms: None Reported


Past Surgical History: No Surgical Hx Reported


Past Psychological History: No Psychological Hx Reported


Smoking Status: Never smoker


Past Alcohol Use History: None Reported


Past Drug Use History: None Reported





Medications and Allergies


                                Home Medications











 Medication  Instructions  Recorded  Confirmed  Type


 


Mirtazapine 15 mg PO HS 08/30/23 08/30/23 History








                                    Allergies











Allergy/AdvReac Type Severity Reaction Status Date / Time


 


Iodinated Contrast Media Allergy  Rash/Hives Verified 08/30/23 07:15


 


iodine Allergy  Rash/Hives Verified 08/30/23 07:15














Physical Exam


Vitals: 


                                   Vital Signs











  Temp Pulse Resp BP Pulse Ox


 


 08/30/23 07:40  98.4 F  85  18  104/79  97


 


 08/30/23 06:30   70  17  105/72  96


 


 08/30/23 05:11   67  17  114/79  97


 


 08/30/23 04:18   58 L  19  120/84 


 


 08/30/23 02:31  98.3 F  52 L  19  103/78  98








                                Intake and Output











 08/29/23 08/30/23 08/30/23





 22:59 06:59 14:59


 


Other:   


 


  Weight  117.934 kg 














Results





                                 08/30/23 02:54





                                 08/30/23 02:54


                                 Cardiac Enzymes











  08/30/23 08/30/23 08/30/23 Range/Units





  02:54 02:54 06:39 


 


AST  27    (17-59)  U/L


 


Troponin I   <0.012  <0.012  (0.000-0.034)  ng/mL








                                   Coagulation











  08/30/23 Range/Units





  02:54 


 


PT  10.9  (9.0-12.0)  sec


 


APTT  21.4 L  (22.0-30.0)  sec








                                       CBC











  08/30/23 Range/Units





  02:54 


 


WBC  12.6 H  (3.8-10.6)  k/uL


 


RBC  4.70  (4.30-5.90)  m/uL


 


Hgb  15.0  (13.0-17.5)  gm/dL


 


Hct  41.9  (39.0-53.0)  %


 


Plt Count  264  (150-450)  k/uL








                          Comprehensive Metabolic Panel











  08/30/23 Range/Units





  02:54 


 


Sodium  138  (137-145)  mmol/L


 


Potassium  3.6  (3.5-5.1)  mmol/L


 


Chloride  106  ()  mmol/L


 


Carbon Dioxide  22  (22-30)  mmol/L


 


BUN  17  (9-20)  mg/dL


 


Creatinine  1.19  (0.66-1.25)  mg/dL


 


Glucose  138 H  (74-99)  mg/dL


 


Calcium  9.2  (8.4-10.2)  mg/dL


 


AST  27  (17-59)  U/L


 


ALT  27  (4-49)  U/L


 


Alkaline Phosphatase  75  ()  U/L


 


Total Protein  7.2  (6.3-8.2)  g/dL


 


Albumin  4.2  (3.5-5.0)  g/dL








                               Current Medications











Generic Name Dose Route Start Last Admin





  Trade Name Freq  PRN Reason Stop Dose Admin


 


Heparin Sodium (Porcine)  5,000 unit  08/30/23 08:00  08/30/23 07:39





  Heparin Sodium,Porcine 5,000 Unit/Ml 1 Ml Vial  SQ   Not Given





  Q8HR BRANDEN  


 


Morphine Sulfate  4 mg  08/30/23 04:20 





  Morphine Sulfate 4 Mg/Ml Syringe  IV  





  Q4HR PRN  





  Severe Pain (Scale 7 to 10)  


 


Naloxone HCl  0.2 mg  08/30/23 04:20 





  Naloxone 0.4 Mg/Ml 1 Ml Vial  IV  





  Q2M PRN  





  Opioid Reversal  








                                Intake and Output











 08/29/23 08/30/23 08/30/23





 22:59 06:59 14:59


 


Other:   


 


  Weight  117.934 kg 








                                        





                                 08/30/23 02:54 





                                 08/30/23 02:54

## 2023-08-30 NOTE — CA
Exercise Stress Test Report 

 

 Name:    Zhao Wise 

 

 MRN:    [id number] 

 

 Exam Date: 2023 11:31 

 

 Exam Location:      [place]  

                     Stress 

 

 Ht (in):     75     Wt (lb):     260    BSA:    2.45 

 

 Ordering Phys:       Klarissa Rankin 

 

 Referring Phys:      LORI, 

 

 Technologist:        Cabrera Gonzalez 

 

 Age:    43    Gender:    M 

 

 :    1980 

 Procedure CPT: 

 

 Indications:              CP 

 

 ICD-10 Codes: 

 

 Patient History:          CHEST PAIN, NUMBNESS IN FACE/NECK, PRIOR  

                           SMOKER 

 

 Medications: 

 

 Meds past 24 hrs: 

 

 Pretest Chest Pain: 

 

 STRESS TEST      Hank 

 

 Protocol 

 

 

 

 

 Exercise Duration (min:sec):         06:00 

 Max ST Depressions (mm): 

 Angina Score: 

 North Score: 

 Resting HR (bpm):      111 

 

 Peak HR (bpm):         168 

 

 Resting BP (mmHg):       114    /   85 

 

 Peak BP (mmHg):       172   /   75 

 

 MPHR:    179     Target HR:      152 

 

 % MPHR:     94 

 METS:  7.1 

 

 Total Dose: 

 Peak Dose: 

 Atropine: 

 Double Product:       97075 

 

 BP Response: 

 

 Stress Termination:       TARGET HR REACHED/MAX EXERTION 

 

 Stress Symptoms: 

 DIFFICULTY IN BREATHING 

 

 Stress Summary: 

 

 

  ECG ANALYSIS 

 

 Resting ECG: 

 

 Stress ECG: 

 

 CONCLUSIONS 

 Excellent exercise tolerance 

 Normal EKG in response to exercise 

 

 Dr. Mkie Santana MD 

 (Electronically Signed) 

 Final Date:      2023 14:00

## 2023-08-30 NOTE — P.HPIM
History of Present Illness


H&P Date: 08/30/23





History of present illness; patient is a 43-year-old gentleman past medical hist

ory significant for kidney stones, Meth use who presented to the ER from from 

[facility] accompanied by police because of sudden onset of chest pain.  Patient 

stated that he woke up this morning's with chest pain, chest pain was located in

his left left side, sharp, intermittent radiating towards his back and shoulder 

blades.  Chest pain was not associated any shortness of breath.  There was no 

complete of any sweating.  No nausea or vomiting.  Denies any palpitations.  

Denies any orthopnea or PND.  Because of chest pain, patient came to the ER


Initial lab work done in the ER showed WBC 12.6, hemoglobin 15, platelet count 

264, sodium 1:30, potassium 3.6, BUN 17, creatinine 1.19, glucose 138 troponin 

0.012


EKG done in the ER showed heart rate 47, , no ST segment elevation or T-

wave inversion noticeable in any leads


CTA thorax showed focal  ectasia ,  prominence of the proximal descending 

thoracic aorta at 3.2 cm, no aortic dissection, note AAA.





Patient admitted to medicine service








REVIEW OF SYSTEMS: 


CONSTITUTIONAL: No fever, no malaise, no fatigue. 


HEENT: No recent visual problems or hearing problems. Denied any sore throat. 


CARDIOVASCULAR: As mentioned in HPI


PULMONARY:As mentioned in HPI. 


GASTROINTESTINAL: No diarrhea, no nausea, no vomiting, no abdominal pain. 


NEUROLOGICAL: No headaches, no weakness, no numbness. 


HEMATOLOGICAL: Denies any bleeding or petechiae. 


GENITOURINARY: Denies any burning micturition, frequency, or urgency. 


MUSCULOSKELETAL/RHEUMATOLOGICAL: Denies any joint pain, swelling, or any muscle 

pain. 


ENDOCRINE: Denies any polyuria or polydipsia. 





The rest of the 14-point review of systems is negative.











PHYSICAL EXAMINATION: 





GENERAL: The patient is alert and oriented x3, not in any acute distress. Well 

developed, well nourished. 


HEENT: Pupils are round and equally reacting to light. EOMI. No scleral icterus.

No conjunctival pallor. Normocephalic, atraumatic. No pharyngeal erythema. No 

thyromegaly. 


CARDIOVASCULAR: S1 and S2 present. No murmurs, rubs, or gallops. 


PULMONARY: Chest is clear to auscultation, no wheezing or crackles. 


ABDOMEN: Soft, nontender, nondistended, normoactive bowel sounds. No palpable 

organomegaly. 


MUSCULOSKELETAL: No joint swelling or deformity.


EXTREMITIES: No cyanosis, clubbing, or pedal edema. 


NEUROLOGICAL: Gross neurological examination did not reveal any focal deficits. 


SKIN: No rashes. 





Assessment and plan


Chest pain


History of kidney stones


History of meth use





Monitor vital signs


Monitor CBC


Monitor CMP


Continue telemetry monitoring


Trend troponins


Continue pain control


Echo ordered


Results of CT thorax noted


Cardiology consulted


Labs and medication were reviewed..  Continue same treatment.  Continue with 

symptomatic treatment.  Resume home medication.  Monitor labs and vitals.  DVT 

and GI prophylaxis.  Further recommendations as per clinical course of the 

patient





Dictation was produced using dragon dictation software. please excuse any 

grammatical, word or spelling errors. 








Past Medical History


Past Medical History: No Reported History


History of Any Multi-Drug Resistant Organisms: None Reported


Past Surgical History: No Surgical Hx Reported


Past Psychological History: No Psychological Hx Reported


Smoking Status: Never smoker


Past Alcohol Use History: None Reported


Past Drug Use History: None Reported





Medications and Allergies


                                Home Medications











 Medication  Instructions  Recorded  Confirmed  Type


 


Mirtazapine 15 mg PO HS 08/30/23 08/30/23 History








                                    Allergies











Allergy/AdvReac Type Severity Reaction Status Date / Time


 


Iodinated Contrast Media Allergy  Rash/Hives Verified 08/30/23 07:15


 


iodine Allergy  Rash/Hives Verified 08/30/23 07:15














Physical Exam


Vitals: 


                                   Vital Signs











  Temp Pulse Resp BP Pulse Ox


 


 08/30/23 07:40  98.4 F  85  18  104/79  97


 


 08/30/23 06:30   70  17  105/72  96


 


 08/30/23 05:11   67  17  114/79  97


 


 08/30/23 04:18   58 L  19  120/84 


 


 08/30/23 02:31  98.3 F  52 L  19  103/78  98








                                Intake and Output











 08/29/23 08/30/23 08/30/23





 22:59 06:59 14:59


 


Other:   


 


  Weight  117.934 kg 














Results


CBC & Chem 7: 


                                 08/30/23 02:54





                                 08/30/23 02:54


Labs: 


                  Abnormal Lab Results - Last 24 Hours (Table)











  08/30/23 08/30/23 08/30/23 Range/Units





  02:54 02:54 02:54 


 


WBC  12.6 H    (3.8-10.6)  k/uL


 


Neutrophils #  9.1 H    (1.3-7.7)  k/uL


 


APTT   21.4 L   (22.0-30.0)  sec


 


Glucose    138 H  (74-99)  mg/dL

## 2023-08-30 NOTE — CA
Transthoracic Echo Report 

 Name: Zhao Wise 

 MRN:    [id number] 

 Age:    43     Gender:     M 

 

 :    1980 

 Exam Date:     2023 08:10 

 Exam Location: [facility] 

 Ht (in):     75     Wt (lb):     260 

 Ordering Physician:        Donaldo Flannery MD 

 Attending/Referring Phys: 

 Technician         Todd Keller 

 Procedure CPT: 

 Indications:       Chest Pain 

 

 Cardiac Hx: 

 Technical Quality:      Fair 

 Contrast 1:                                Total Dose (mL): 

 Contrast 2:                                Total Dose (mL): 

 

 MEASUREMENTS  (Male / Female) Normal Values 

 2D ECHO 

 LV Diastolic Diameter PLAX        4.9 cm                4.2 - 5.9 / 3.9 - 5.3 cm 

 LV Systolic Diameter PLAX         3.3 cm                 

 IVS Diastolic Thickness           1.1 cm                0.6 - 1.0 / 0.6 - 0.9 cm 

 LVPW Diastolic Thickness          1.1 cm                0.6 - 1.0 / 0.6 - 0.9 cm 

 LV Relative Wall Thickness        0.5                    

 RV Internal Dim ED PLAX           3.3 cm                 

 LVOT Diameter                     2.3 cm                 

 Aortic Root Diameter              3.4 cm                 

 LA Systolic Diameter LX           1.9 cm                3.0 - 4.0 / 2.7 - 3.8 cm 

 LV Diastolic Volume MOD BP        65.1 cm???              67 - 155 / 56 - 104 cm??? 

 LV Systolic Volume MOD BP         24.1 cm???              22 - 58 / 19 - 49 cm??? 

 LV Ejection Fraction MOD BP       62.9 %                >= 55  % 

 LV Cardiac Index MOD BP           1068.9 cm???/min???m???      

 LV Diastolic Volume MOD 4C        70.8 cm???               

 LV Systolic Volume MOD 4C         26.7 cm???               

 LV Ejection Fraction MOD 4C       62.3 %                 

 LV Cardiac Index MOD 4C           1150.4 cm???/min???m???      

 LV Diastolic Length 4C            8.5 cm                 

 LV Systolic Length 4C             7.1 cm                 

 LV Diastolic Volume MOD 2C        58.3 cm???               

 LV Systolic Volume MOD 2C         21.0 cm???               

 LV Ejection Fraction MOD 2C       64.0 %                 

 LV Cardiac Index MOD 2C           974.3 cm???/min???m???       

 LV Diastolic Length 2C            8.2 cm                 

 LV Systolic Length 2C             6.8 cm                 

 LA Volume                         38.5 cm???              18 - 58 / 22 - 52 cm??? 

 

 DOPPLER 

 AV Peak Velocity                  142.9 cm/s             

 AV Peak Gradient                  8.2 mmHg               

 LVOT Peak Velocity                123.3 cm/s             

 LVOT Peak Gradient                6.1 mmHg               

 AV Area Cont Eq pk                3.6 cm???                

 MV Peak Velocity                  84.2 cm/s              

 MV Peak Gradient                  2.8 mmHg               

 MV Mean Velocity                  53.5 cm/s              

 MV Mean Gradient                  1.4 mmHg               

 MV Velocity Time Integral         25.2 cm                

 Mitral E Point Velocity           78.5 cm/s              

 Mitral A Point Velocity           95.9 cm/s              

 Mitral E to A Ratio               0.8                    

 MV Deceleration Time              278.8 ms               

 MV E' Velocity                    7.4 cm/s               

 Mitral E to MV E' Ratio           10.6                   

 TR Peak Velocity                  120.6 cm/s             

 TR Peak Gradient                  5.8 mmHg               

 Right Ventricular Systolic Press  10.8 mmHg              

 

 

 FINDINGS 

 Left Ventricle 

 Normal LV size and wall thickness. Left ventricular ejection fraction is  

 estimated at 50-55 %. 

 

 Right Ventricle 

 Normal right ventricular size. 

 

 Right Atrium 

 Normal right atrial size. 

 

 Left Atrium 

 Normal left atrial size. LA volume= 16ml/m2 

 

 Mitral Valve 

 Structurally normal mitral valve. Trace MR. 

 

 Aortic Valve 

 Aortic valve not well visualized but grossly unremarkable. No aortic valve  

 stenosis or regurgitation. 

 

 Tricuspid Valve 

 Tricuspid valve not well visualized. Trace TR. 

 

 Pulmonic Valve 

 Pulmonic valve not well visualized. No pulmonic regurgitation. 

 

 Pericardium 

 Not well visualized. 

 

 Aorta 

 Normal size aortic root . 

 

 CONCLUSIONS 

 Normal LV systolic function 

 Poorly visualized aortic valve but there is no stenosis or regurgitation 

 Previewed by:  

 Dr. Mike Santana MD 

 (Electronically Signed) 

 Final Date:      2023 14:07